# Patient Record
(demographics unavailable — no encounter records)

---

## 2024-10-22 NOTE — PHYSICAL EXAM
[Hearing Butterfield Test (Tuning Fork On Forehead)] : no lateralization of tone [Midline] : trachea located in midline position [Normal] : no rashes [Hearing Loss Right Only] : normal [Hearing Loss Left Only] : normal [Rinne Test Air Conduction Persists > Bone Conduction Right] : bone conduction greater than air conduction on the right [Rinne Test Air Conduction Persists > Bone Conduction Left] : bone conduction greater than air conduction on the left [Nystagmus] : ~T ~M nystagmus was seen [Fukuda Step Test] : Fukuda Step Test was Positive [Romberg's Sign] : Romberg's sign was absent [Fistula Sign] : Fistula Sign: Negative [Past-Pointing] : Past-Pointing: Negative [Jada-Halljefersonke] : [place]-Hallpike: Negative [FreeTextEntry1] : + fukuda to L

## 2024-10-22 NOTE — HISTORY OF PRESENT ILLNESS
[de-identified] : 41 year old female presents for dizziness. s/p Left odontogenic sinusitis s/p ESS 6/17/24 with Dr Krause- saw him for the dizziness where he did not think it was vertigo and told to follow up here.  States since the beginning of this year she has been feeling dizzy and off balance. States it is worse when she moves her head quickly and going from sitting to standing position.  States when she is on the computer or staring at a screen she will feel her eyes flutter.  Has tried meclizine with relief. Episodes are on and off. Will go away for 3 months and then come back.  States intermittent feeling of ears being clogged. States having headaches that will wake her up during the night.  Denies otalgia, otorrhea, ear infections, hearing loss, tinnitus.  No imaging done.

## 2024-10-22 NOTE — DATA REVIEWED
[de-identified] : An audiogram was ordered and performed including pure tones, tympanometry and speech testing for the patients complaint of vertigo I have independently reviewed the patient's audiogram from today and my findings include normal hearing

## 2024-10-22 NOTE — HISTORY OF PRESENT ILLNESS
[de-identified] : 41 year old female presents for dizziness. s/p Left odontogenic sinusitis s/p ESS 6/17/24 with Dr Krause- saw him for the dizziness where he did not think it was vertigo and told to follow up here.  States since the beginning of this year she has been feeling dizzy and off balance. States it is worse when she moves her head quickly and going from sitting to standing position.  States when she is on the computer or staring at a screen she will feel her eyes flutter.  Has tried meclizine with relief. Episodes are on and off. Will go away for 3 months and then come back.  States intermittent feeling of ears being clogged. States having headaches that will wake her up during the night.  Denies otalgia, otorrhea, ear infections, hearing loss, tinnitus.  No imaging done.

## 2024-10-22 NOTE — PHYSICAL EXAM
[Hearing Butterfield Test (Tuning Fork On Forehead)] : no lateralization of tone [Midline] : trachea located in midline position [Normal] : no rashes [Hearing Loss Right Only] : normal [Hearing Loss Left Only] : normal [Rinne Test Air Conduction Persists > Bone Conduction Right] : bone conduction greater than air conduction on the right [Rinne Test Air Conduction Persists > Bone Conduction Left] : bone conduction greater than air conduction on the left [Nystagmus] : ~T ~M nystagmus was seen [Fukuda Step Test] : Fukuda Step Test was Positive [Romberg's Sign] : Romberg's sign was absent [Fistula Sign] : Fistula Sign: Negative [Past-Pointing] : Past-Pointing: Negative [Jada-Halljefersonke] : [place]-Hallpike: Negative [FreeTextEntry1] : + fukuda to L

## 2024-10-22 NOTE — DATA REVIEWED
[de-identified] : An audiogram was ordered and performed including pure tones, tympanometry and speech testing for the patients complaint of vertigo I have independently reviewed the patient's audiogram from today and my findings include normal hearing

## 2025-03-18 NOTE — PHYSICAL EXAM
[Midline] : trachea located in midline position [Hearing Butterfield Test (Tuning Fork On Forehead)] : no lateralization of tone [Normal] : gait was normal [Nystagmus] : ~T ~M nystagmus was seen [Fukuda Step Test] : Fukuda Step Test was Positive

## 2025-03-26 NOTE — REASON FOR VISIT
[Subsequent Evaluation] : a subsequent evaluation for [FreeTextEntry2] :  Left odontogenic sinusitis s/p ESS 6/17/24

## 2025-03-26 NOTE — HISTORY OF PRESENT ILLNESS
[de-identified] : 41 year old female hx  Left odontogenic sinusitis s/p ESS 6/17/24 presents for follow up LCV 9/25/24  States overall feeling well on the left side 10/2024- noted increased intermittent Right sided nasal congestion, right facial pressure and discolored drainage  Took Augmentin and medrol a month ago with some improvement  Overall feeling better- still noted Right nasal congestion when laying flat and mild right sided facial pressure No current anterior rhinorrhea, PND About 3 episodes of Right sided epistaxis in the last few months- most recent a month ago Sense of smell is good Using saline rinses once a day

## 2025-04-28 NOTE — PHYSICAL EXAM
[de-identified] : Constitutional o Appearance : well-nourished, well developed, alert, in no acute distress  Head and Face o Head :  Inspection : atraumatic, normocephalic o Face :  Inspection : no visible rash or discoloration Respiratory o Respiratory Effort: breathing unlabored  Neurologic o Mental Status Examination :  Orientation : grossly oriented to person, place and time Psychiatric o Mood and Affect: mood normal, affect appropriate   Right Lower Extremity o Buttock : no tenderness, swelling or deformities  o Right Hip :  Inspection/Palpation : no tenderness, swelling or deformities  Range of Motion : full and painless in all planes, no crepitance  Stability : joint stability intact  Strength : extension, flexion, adduction, abduction, internal rotation and external rotation 4-/5   o Right Knee :  Inspection/Palpation : medial facettenderness to palpation, no swelling  Range of Motion : 0-130 active flexion and extension full and painless, no crepitance  Stability : no valgus or varus instability present on provocative testing  Strength : flexion and extension 4-/5  Tests and Signs : Anterior Drawer negative, Lachman negative, Kavya's negative  Left Lower Extremity o Buttock : no tenderness, swelling or deformities  o Left Hip :  Inspection/Palpation : no tenderness, no swelling or deformities  Range of Motion : full and painless in all planes, no crepitance  Stability : joint stability intact  Strength : extension, flexion, adduction, abduction, internal rotation and external rotation 4-/5  o Left Knee :  Inspection/Palpation : medial and lateral compartment tenderness to palpation, no swelling  Range of motion: 0-105 pain patella femoral pain with extension, no crepitance  Stability : no valgus or varus instability present on provocative testing  Strength : flexion and extension 4-/5  Tests and Signs : Anterior Drawer negative, Lachman negative, Kavya's negative  Gait: antalgic on the left , no significant extremity swelling or lymphedema  Radiology Results 4/28/2025  o Left Knee : Standing AP, lateral and tunnel views of the knee were obtained and revealed sig medial and moderate patellofemoral arthritis with patella tracy   o Knee injection : Indication- left knee osteoarthritis, Anatomic location- left intra-articular joint space, Spray - area was sterilized with Betadine and alcohol and anesthetized with Ethyl Chloride , needle used-20G, Medications given- 100mg lidocaine, 20mg kenalog, 5mg dexamethasone. The patient tolerated the procedure well.

## 2025-04-28 NOTE — HISTORY OF PRESENT ILLNESS
[de-identified] : 41 year old female presents complaining of left knee pain. The patient cannot attribute her pain to any injury, fall, or trauma. She last had a Monovisc injection in June 2024. She has lost 16 pounds on Zepbound since March.  She notes her symptoms are exacerbated with bending the knee, walking, standing from a seated position, and stairs. Patient states she has trouble walking. Of note, She has been diagnosed with Crohn's disease and is on medication for it.  Has been doing [employer] classes as well as aerobics and feels that that may have aggravated her knee.  Radiology Results: 5/14/2024 Right Knee: Standing AP, lateral, tunnel and skyline views were obtained and reveal moderate medial and patellofemoral arthritis with patella tracy  Left Knee: Standing AP, lateral, tunnel and skyline views were obtained and reveal moderate medial and patellofemoral arthritis with patella atla

## 2025-04-28 NOTE — DISCUSSION/SUMMARY
[de-identified] : I went over the pathophysiology of the patient's symptoms in great detail with the patient. I discussed the underlying pathophysiology of the patient's condition in great detail with the patient. I went over the patient's x-rays with them in great detail. The patient elected to receive a cortisone injection into her left today, and tolerated it well. I instructed the patient on ROM exercises, and told them to take it easy. The use of ice and rest was reviewed with the patient. The patient may resume activities tomorrow. Viscosupplementation was discussed as a solution to the patient's symptoms, and we are requesting Monovisc for left knee. At this time, she will start a course of physical therapy for strengthening and flexibility. A prescription was provided. I stressed the importance of weight loss and its benefits to the patients joints and overall health. She needs to avoid high-impact activities such as running and jumping or riding a treadmill. I recommend alternative activities such as riding a stationary bike or elliptical on low tension. She should focus on light weight and high repetition exercises. She should avoid squatting and kneeling.   All of their questions were answered. They understand and consent to the plan.   FU in 4-6 weeks.

## 2025-04-28 NOTE — ADDENDUM
[FreeTextEntry1] : I, ARLYN POLLACK, acted solely as a scribe for Dr. Jd Love on this date 04/28/2025.  All medical record entries made by the Scribe were at my, Dr. Jd Love, direction and personally dictated by me on 04/28/2025. I have reviewed the chart and agree that the record accurately reflects my personal performance of the history, physical exam, assessment and plan. I have also personally directed, reviewed, and agreed with the chart.

## 2025-05-19 NOTE — ADDENDUM
[FreeTextEntry1] : I, ARLYN POLLACK, acted solely as a scribe for Dr. Jd Love on this date 05/19/2025.  All medical record entries made by the Scribe were at my, Dr. Jd Love, direction and personally dictated by me on 05/19/2025. I have reviewed the chart and agree that the record accurately reflects my personal performance of the history, physical exam, assessment and plan. I have also personally directed, reviewed, and agreed with the chart.

## 2025-05-19 NOTE — PHYSICAL EXAM
[de-identified] : Constitutional o Appearance : well-nourished, well developed, alert, in no acute distress  Head and Face o Head :  Inspection : atraumatic, normocephalic o Face :  Inspection : no visible rash or discoloration Respiratory o Respiratory Effort: breathing unlabored  Neurologic o Mental Status Examination :  Orientation : grossly oriented to person, place and time Psychiatric o Mood and Affect: mood normal, affect appropriate   Right Lower Extremity o Buttock : no tenderness, swelling or deformities  o Right Hip :  Inspection/Palpation : no tenderness, swelling or deformities  Range of Motion : full and painless in all planes, no crepitance  Stability : joint stability intact  Strength : extension, flexion, adduction, abduction, internal rotation and external rotation 4-/5   o Right Knee :  Inspection/Palpation : medial facettenderness to palpation, no swelling  Range of Motion : 0-130 active flexion and extension full and painless, no crepitance  Stability : no valgus or varus instability present on provocative testing  Strength : flexion and extension 4-/5  Tests and Signs : Anterior Drawer negative, Lachman negative, Kavya's negative  Left Lower Extremity o Buttock : no tenderness, swelling or deformities  o Left Hip :  Inspection/Palpation : no tenderness, no swelling or deformities  Range of Motion : full and painless in all planes, no crepitance  Stability : joint stability intact  Strength : extension, flexion, adduction, abduction, internal rotation and external rotation 4-/5  o Left Knee :  Inspection/Palpation : medial and lateral compartment tenderness to palpation, no swelling  Range of motion: 0-105 pain patella femoral pain with extension, no crepitance  Stability : no valgus or varus instability present on provocative testing  Strength : flexion and extension 4-/5  Tests and Signs : Anterior Drawer negative, Lachman negative, Kavya's negative  Gait: antalgic on the left , no significant extremity swelling or lymphedema  o Knee injection : Indication- left knee osteoarthritis, Anatomic location- left intra-articular joint space, Spray - area was sterilized with Betadine and alcohol and anesthetized with Ethyl Chloride , needle used-20G, Medications given- 88mg/4mL Monovisc under Ultrasound guidance. The patient tolerated the procedure well.

## 2025-05-19 NOTE — HISTORY OF PRESENT ILLNESS
[de-identified] : 41 year old female presents for a left knee Monovisc injection today 5/19/2025. She had a left knee cortisone injection on 4/28/2025 and notes it only lasted two weeks. The patient cannot attribute her pain to any injury, fall, or trauma. She has lost 16 pounds on Zepbound since March.  She notes her symptoms are exacerbated with bending the knee, walking, standing from a seated position, and stairs. Patient states she has trouble walking. Of note, She has been diagnosed with Crohn's disease and is on medication for it.  Has been doing [employer] classes as well as aerobics and feels that that may have aggravated her knee.  Radiology Results 4/28/2025  o Left Knee : Standing AP, lateral and tunnel views of the knee were obtained and revealed sig medial and moderate patellofemoral arthritis with patella tracy   Radiology Results: 5/14/2024 Right Knee: Standing AP, lateral, tunnel and skyline views were obtained and reveal moderate medial and patellofemoral arthritis with patella tracy  Left Knee: Standing AP, lateral, tunnel and skyline views were obtained and reveal moderate medial and patellofemoral arthritis with patella atla

## 2025-05-19 NOTE — DISCUSSION/SUMMARY
[de-identified] : I went over the pathophysiology of the patient's symptoms in great detail with the patient. The patient elected to receive a Monovisc injection into her left knee today, and tolerated it well. I instructed the patient on ROM exercises, and told them to take it easy. The use of ice and rest was reviewed with the patient. The patient may resume activities tomorrow. I reminded the patient that it takes 4 to 6 weeks after the final injection to feel symptom relief. I stressed the importance of weight loss and its benefits to the patients joints and overall health.   All of their questions were answered. They understand and consent to the plan.   FU in 6 weeks.

## 2025-06-12 NOTE — ADDENDUM
[FreeTextEntry1] : I, ARLYN POLLACK, acted solely as a scribe for Dr. Jd Love on this date 06/12/2025.  All medical record entries made by the Scribe were at my, Dr. Jd Love, direction and personally dictated by me on 06/12/2025. I have reviewed the chart and agree that the record accurately reflects my personal performance of the history, physical exam, assessment and plan. I have also personally directed, reviewed, and agreed with the chart.

## 2025-06-12 NOTE — PHYSICAL EXAM
[de-identified] : Constitutional o Appearance : well-nourished, well developed, alert, in no acute distress  Head and Face o Head :  Inspection : atraumatic, normocephalic o Face :  Inspection : no visible rash or discoloration Respiratory o Respiratory Effort: breathing unlabored  Neurologic o Mental Status Examination :  Orientation : grossly oriented to person, place and time Psychiatric o Mood and Affect: mood normal, affect appropriate   Right Lower Extremity o Buttock : no tenderness, swelling or deformities  o Right Hip :  Inspection/Palpation : no tenderness, swelling or deformities  Range of Motion : full and painless in all planes, no crepitance  Stability : joint stability intact  Strength : extension, flexion, adduction, abduction, internal rotation and external rotation 5/5   o Right Knee :  Inspection/Palpation : medial facettenderness to palpation, no swelling  Range of Motion : 0-130 active flexion and extension full and painless, no crepitance  Stability : no valgus or varus instability present on provocative testing  Strength : flexion and extension 5/5  Tests and Signs : Anterior Drawer negative, Lachman negative, Kavya's negative  Left Lower Extremity o Buttock : no tenderness, swelling or deformities  o Left Hip :  Inspection/Palpation : no tenderness, no swelling or deformities  Range of Motion : full and painless in all planes, no crepitance  Stability : joint stability intact  Strength : extension, flexion, adduction, abduction, internal rotation and external rotation 5/5  o Left Knee :  Inspection/Palpation : medial facet tenderness more medial than lateral compartment tenderness to palpation, no swelling  Range of motion: 0-130 pain patella femoral pain with extension, no crepitance, good patellofemoral glide  Stability : no valgus or varus instability present on provocative testing  Strength : flexion and extension 5/5  Tests and Signs : Anterior Drawer negative, Lachman negative, Kavya's negative  Gait: heel/toe  on the left , no significant extremity swelling or lymphedema

## 2025-06-12 NOTE — HISTORY OF PRESENT ILLNESS
[de-identified] : 41 year old female presents for a left knee follow-up. She had a left knee Monovisc injection on 5/19/2025. She is concerned about a lump on the medial aspect of her left shin  She had a left knee cortisone injection on 4/28/2025 and notes it only lasted two weeks. The patient cannot attribute her pain to any injury, fall, or trauma. She has lost 20 pounds on Zepbound since March.  She no longer does not have discomfort with steps. Of note, She has been diagnosed with Crohn's disease and is on medication for it.  Has been doing [employer] classes as well as aerobics and feels that that may have aggravated her knee.  Radiology Results 4/28/2025  o Left Knee : Standing AP, lateral and tunnel views of the knee were obtained and revealed sig medial and moderate patellofemoral arthritis with patella tracy   Radiology Results: 5/14/2024 Right Knee: Standing AP, lateral, tunnel and skyline views were obtained and reveal moderate medial and patellofemoral arthritis with patella tracy  Left Knee: Standing AP, lateral, tunnel and skyline views were obtained and reveal moderate medial and patellofemoral arthritis with patella atla

## 2025-06-12 NOTE — DISCUSSION/SUMMARY
[de-identified] : I went over the pathophysiology of the patient's symptoms in great detail with the patient. At-home strengthening exercises were discussed and demonstrated with the patient. The patient was instructed in ROM exercises they are to do at home. I instructed the patient on frequent icing, ankle pumping, leg lifts, tightening exercises, and ROM exercises. She needs to avoid high-impact activities such as running and jumping or riding a treadmill. I recommend alternative activities such as riding a stationary bike or elliptical on low tension. She should focus on light weight and high repetition exercises. She should avoid squatting and kneeling. The benefits of pool exercises were discussed in detail with the patient. I stressed the importance of weight loss and its benefits to the patients joints and overall health.  We discussed the use of ice, Tylenol and anti-inflammatories to relieve pain.  All of their questions were answered. They understand and consent to the plan.   FU in 3 months